# Patient Record
Sex: MALE | Race: WHITE
[De-identification: names, ages, dates, MRNs, and addresses within clinical notes are randomized per-mention and may not be internally consistent; named-entity substitution may affect disease eponyms.]

---

## 2017-01-09 ENCOUNTER — HOSPITAL ENCOUNTER (OUTPATIENT)
Dept: HOSPITAL 45 - C.RADBC | Age: 67
Discharge: HOME | End: 2017-01-09
Attending: NURSE PRACTITIONER
Payer: COMMERCIAL

## 2017-01-09 DIAGNOSIS — N20.0: Primary | ICD-10-CM

## 2017-01-09 NOTE — DIAGNOSTIC IMAGING REPORT
KUB



CLINICAL HISTORY: N20.0 [id number]    



COMPARISON STUDY:  8/10/2006 



FINDINGS: There is no pathologic bowel dilatation. There is a 3 mm calcification

projected over the lower pole the left kidney suspicious for a calculus. There

is a mild lumbar scoliosis. Degenerative changes are present within the spine.



IMPRESSION:  Suspected 3 mm left renal calculus 







Electronically signed by:  Brenton Carmichael M.D.

1/9/2017 2:47 PM



Dictated Date/Time:  1/9/2017 2:46 PM

## 2017-01-24 LAB
ANION GAP SERPL CALC-SCNC: 6 MMOL/L (ref 3–11)
BASOPHILS # BLD: 0.07 K/UL (ref 0–0.2)
BASOPHILS NFR BLD: 1.2 %
BUN SERPL-MCNC: 13 MG/DL (ref 7–18)
BUN/CREAT SERPL: 13.8 (ref 10–20)
CALCIUM SERPL-MCNC: 8.7 MG/DL (ref 8.5–10.1)
CHLORIDE SERPL-SCNC: 108 MMOL/L (ref 98–107)
CO2 SERPL-SCNC: 29 MMOL/L (ref 21–32)
COMPLETE: YES
CREAT CL PREDICTED SERPL C-G-VRATE: 90.7 ML/MIN
CREAT SERPL-MCNC: 0.97 MG/DL (ref 0.6–1.4)
EOSINOPHIL NFR BLD AUTO: 168 K/UL (ref 130–400)
GLUCOSE SERPL-MCNC: 94 MG/DL (ref 70–99)
HCT VFR BLD CALC: 41.6 % (ref 42–52)
IG%: 0.2 %
IMM GRANULOCYTES NFR BLD AUTO: 34.2 %
LYMPHOCYTES # BLD: 2.05 K/UL (ref 1.2–3.4)
MCH RBC QN AUTO: 30.5 PG (ref 25–34)
MCHC RBC AUTO-ENTMCNC: 34.1 G/DL (ref 32–36)
MCV RBC AUTO: 89.3 FL (ref 80–100)
MONOCYTES NFR BLD: 9.2 %
NEUTROPHILS # BLD AUTO: 9.7 %
NEUTROPHILS NFR BLD AUTO: 45.5 %
PMV BLD AUTO: 10.1 FL (ref 7.4–10.4)
POTASSIUM SERPL-SCNC: 4.6 MMOL/L (ref 3.5–5.1)
RBC # BLD AUTO: 4.66 M/UL (ref 4.7–6.1)
SODIUM SERPL-SCNC: 143 MMOL/L (ref 136–145)
WBC # BLD AUTO: 5.99 K/UL (ref 4.8–10.8)

## 2017-01-24 NOTE — PAT MEDICATION INSTRUCTIONS
Service Date


Jan 24, 2017.





Current Home Medication List


Aspirin (Aspirin Ec), 325 MG PO QAM


Cholecalciferol (Vitamin D3), 1 TAB PO BID


Fesoterodine Fumarate (Toviaz), 1 TAB PO QAM


Finasteride (Proscar), 5 MG PO QPM


Fish Oil ([place]-3), 1,000 MG PO BID


Losartan Potassium (Cozaar), 50 MG PO QAM


Meloxicam (Mobic), 7.5 MG PO BID


Metoprolol Tartrate (Lopressor) (Lopressor), 12.5 MG PO BID


Multivitamin (Multivitamin), 1 TAB PO QPM


Nitroglycerin (Nitrostat), 0.4 MG SL UD PRN for RN


Simvastatin (Zocor), 20 MG PO QAM


[Vitamin B12], 1 TAB PO QPM





Medication Instructions


For Your Scheduled Surgery 





Aspirin (Aspirin Ec), 325 MG PO QAM (Check with surgeon/cardiologist for 

instructions)


Meloxicam (Mobic), 7.5 MG PO BID (check with surgeon for instructions)





- Hold the following medications starting 1/24/17:


Fish Oil ([place]-3), 1,000 MG PO BID





- Hold the following medications the morning of surgery:


Losartan Potassium (Cozaar), 50 MG PO QAM


Cholecalciferol (Vitamin D3), 1 TAB PO BID


Fesoterodine Fumarate (Toviaz), 1 TAB PO QAM





- Take the following medications the morning of surgery with a sip of water:


Simvastatin (Zocor), 20 MG PO QAM


Nitroglycerin (Nitrostat), 0.4 MG SL UD PRN for RN


Metoprolol Tartrate (Lopressor) (Lopressor), 12.5 MG PO BID





- Take the following medications as scheduled the night before surgery:


[Vitamin B12], 1 TAB PO QPM


Nitroglycerin (Nitrostat), 0.4 MG SL UD PRN for RN


Multivitamin (Multivitamin), 1 TAB PO QPM


Metoprolol Tartrate (Lopressor) (Lopressor), 12.5 MG PO BID


Finasteride (Proscar), 5 MG PO QPM


Cholecalciferol (Vitamin D3), 1 TAB PO BID





If you have any questions please call us at [phone] (Cheryl Dao PA-C

) or [phone] or [phone]

## 2017-01-26 NOTE — HISTORY & PHYSICAL EXAMINATION
DATE OF ADMISSION:  01/27/2017

 

DIAGNOSIS:  Septal deviation and obstructive sleep apnea.

 

HISTORY OF PRESENT ILLNESS:  This 66-year-old gentleman presented with

history of severe sleep apnea.  His respiratory distress index was 39.4.  His

lowest oxygen saturation was 87%.  He was unable to wear CPAP because of

nasal obstruction.

 

PAST MEDICAL HISTORY:  Medical problems; heart attack in February 2011,

hypertension, hypercholesterolemia and sleep apnea.

 

PREVIOUS SURGERIES:  Back surgery and cardiac stents in 2011.

 

MEDICATIONS:  Metoprolol, simvastatin and losartan.  Aspirin, which is being

held.

 

ALLERGIES:  None known.

 

FAMILY HISTORY:  Negative.

 

SOCIAL HISTORY:  Negative.

 

REVIEW OF SYSTEMS:  Otherwise negative.

 

PHYSICAL EXAMINATION:

GENERAL:  WNWD,  male, 6 feet 1 inch, 225 pounds.

HEAD:  Normocephalic.

EYES:  Normal.

EARS:  Tympanic membranes intact.

NOSE:  Nasal passages; septal deviation to the right with turbinate

hypertrophy.  Pharynx, 3+ tonsillar hypertrophy.

NECK:  Supple.

HEART:  RRR.

LUNGS:  Clear.

ABDOMEN:  Soft.

GENITOURINARY:  Deferred.

EXTREMITIES:  Full range of motion.

 

IMPRESSION:  Septal deviation and obstructive sleep apnea.

 

PLAN:  Septoplasty with adenotonsillectomy, uvulopalatopharyngoplasty and

somnoplasty of the tongue base.

## 2017-01-27 ENCOUNTER — HOSPITAL ENCOUNTER (OUTPATIENT)
Dept: HOSPITAL 45 - C.ACU | Age: 67
Setting detail: OBSERVATION
LOS: 1 days | Discharge: HOME | End: 2017-01-28
Attending: OTOLARYNGOLOGY | Admitting: OTOLARYNGOLOGY
Payer: COMMERCIAL

## 2017-01-27 VITALS
BODY MASS INDEX: 29.28 KG/M2 | HEIGHT: 73 IN | HEIGHT: 73 IN | BODY MASS INDEX: 29.28 KG/M2 | WEIGHT: 220.9 LBS | WEIGHT: 220.9 LBS | BODY MASS INDEX: 29.28 KG/M2

## 2017-01-27 VITALS
OXYGEN SATURATION: 94 % | SYSTOLIC BLOOD PRESSURE: 177 MMHG | TEMPERATURE: 98.06 F | HEART RATE: 60 BPM | DIASTOLIC BLOOD PRESSURE: 85 MMHG

## 2017-01-27 VITALS
HEART RATE: 58 BPM | DIASTOLIC BLOOD PRESSURE: 69 MMHG | SYSTOLIC BLOOD PRESSURE: 157 MMHG | TEMPERATURE: 97.34 F | OXYGEN SATURATION: 93 %

## 2017-01-27 VITALS
SYSTOLIC BLOOD PRESSURE: 156 MMHG | HEART RATE: 59 BPM | OXYGEN SATURATION: 94 % | TEMPERATURE: 98.24 F | DIASTOLIC BLOOD PRESSURE: 71 MMHG

## 2017-01-27 VITALS
OXYGEN SATURATION: 95 % | SYSTOLIC BLOOD PRESSURE: 176 MMHG | TEMPERATURE: 97.88 F | HEART RATE: 63 BPM | DIASTOLIC BLOOD PRESSURE: 82 MMHG

## 2017-01-27 VITALS
SYSTOLIC BLOOD PRESSURE: 183 MMHG | DIASTOLIC BLOOD PRESSURE: 89 MMHG | OXYGEN SATURATION: 94 % | HEART RATE: 61 BPM | TEMPERATURE: 97.88 F

## 2017-01-27 VITALS
SYSTOLIC BLOOD PRESSURE: 166 MMHG | TEMPERATURE: 97.52 F | OXYGEN SATURATION: 96 % | DIASTOLIC BLOOD PRESSURE: 71 MMHG | HEART RATE: 51 BPM

## 2017-01-27 VITALS
TEMPERATURE: 97.88 F | HEART RATE: 43 BPM | SYSTOLIC BLOOD PRESSURE: 201 MMHG | OXYGEN SATURATION: 98 % | DIASTOLIC BLOOD PRESSURE: 88 MMHG

## 2017-01-27 VITALS — OXYGEN SATURATION: 97 %

## 2017-01-27 VITALS
SYSTOLIC BLOOD PRESSURE: 163 MMHG | DIASTOLIC BLOOD PRESSURE: 75 MMHG | OXYGEN SATURATION: 95 % | HEART RATE: 59 BPM | TEMPERATURE: 98.06 F

## 2017-01-27 VITALS
DIASTOLIC BLOOD PRESSURE: 78 MMHG | SYSTOLIC BLOOD PRESSURE: 157 MMHG | OXYGEN SATURATION: 94 % | HEART RATE: 63 BPM | TEMPERATURE: 97.52 F

## 2017-01-27 DIAGNOSIS — J34.2: ICD-10-CM

## 2017-01-27 DIAGNOSIS — I10: ICD-10-CM

## 2017-01-27 DIAGNOSIS — Z79.82: ICD-10-CM

## 2017-01-27 DIAGNOSIS — E78.00: ICD-10-CM

## 2017-01-27 DIAGNOSIS — G47.33: Primary | ICD-10-CM

## 2017-01-27 DIAGNOSIS — I25.2: ICD-10-CM

## 2017-01-27 RX ADMIN — DEXTROSE MONOHYDRATE, SODIUM CHLORIDE, AND POTASSIUM CHLORIDE SCH MLS/HR: 50; 4.5; 1.49 INJECTION, SOLUTION INTRAVENOUS at 22:39

## 2017-01-27 RX ADMIN — CEFAZOLIN SCH MLS/HR: 10 INJECTION, POWDER, FOR SOLUTION INTRAVENOUS at 22:39

## 2017-01-27 RX ADMIN — MORPHINE SULFATE PRN MG: 4 INJECTION, SOLUTION INTRAMUSCULAR; INTRAVENOUS at 15:46

## 2017-01-27 RX ADMIN — HYDROCODONE BITARTRATE AND ACETAMINOPHEN PRN ML: 7.5; 325 SOLUTION ORAL at 13:12

## 2017-01-27 RX ADMIN — DEXTROSE MONOHYDRATE, SODIUM CHLORIDE, AND POTASSIUM CHLORIDE SCH MLS/HR: 50; 4.5; 1.49 INJECTION, SOLUTION INTRAVENOUS at 13:09

## 2017-01-27 RX ADMIN — CEFAZOLIN SCH MLS/HR: 10 INJECTION, POWDER, FOR SOLUTION INTRAVENOUS at 14:32

## 2017-01-27 RX ADMIN — MORPHINE SULFATE PRN MG: 4 INJECTION, SOLUTION INTRAMUSCULAR; INTRAVENOUS at 20:45

## 2017-01-27 RX ADMIN — ACETAMINOPHEN PRN MG: 160 SOLUTION ORAL at 10:34

## 2017-01-27 NOTE — OPERATIVE REPORT
DATE OF OPERATION:  01/27/2017

 

PREOPERATIVE DIAGNOSES:  Obstructive sleep apnea, septal deviation.

 

POSTOPERATIVE DIAGNOSES:  Same.

 

PROCEDURE:  Septoplasty with tonsillectomy and uvulopalatopharyngoplasty and

radiofrequency volume reduction of the tongue base.

 

SURGEON:  Dr. Guzman.

 

ANESTHESIA:  General endotracheal.

 

COMPLICATIONS:  None.

 

BLOOD LOSS:  30 mL

 

HISTORY:  This 66-year-old gentleman presented with significant obstructive

sleep apnea with RDI of 39.4, lowest oxygen saturation of 87%.  He has

difficulty wearing CPAP because of the nasal obstruction.  He also has

significant adenotonsillar hypertrophy and requested definitive treatment.

 

DESCRIPTION OF PROCEDURE:  The patient was brought to the operating room and

placed in supine position.  General endotracheal anesthesia was induced. 

Prepped and draped in the usual sterile manner.  The radiofrequency volume

reduction of the tongue base was performed with [employer] machine creating 7

double prong lesions in the rapid lesion mode with the setting at 600 joules.

 Seven lesions were created across the tongue base posteriorly.

 

The nose was decongested using topical cottonoids with a solution of 4 mL of

4% Xylocaine mixed with 1 mL of epinephrine.  Injection of 0.5% Sensorcaine

with 1:200,000 strength epinephrine was also used.  The left hemitransfixion

incision was made and mucoperichondrium was elevated off the left side of the

septum.  Posteriorly, the cartilage was  from the perpendicular

plate of ethmoid and inferiorly from the vomer maxillary crest.  Bilateral

inferior tunnels were elevated and then bilateral posterior tunnels were

elevated, isolating a large bony cartilaginous spur inferiorly which was

trimmed using 15 blade and the [place]-Chavez rongeurs.  There was also a

large bony spur projecting to the left posteriorly.  This was removed using

the Ramon-Chavez rongeurs and the Yesy forceps superiorly.  The

deviated portion of the perpendicular plate of the ethmoid was removed in

small pieces using the [place]-Chavez rongeur.  The caudal end of the

septum also had to be straightened because there was an acute angulation to

the right.  This was straightened using the cartilage carving technique.  The

septum was closed using continuous mattress suture of 4-0 plain gut. 

Anterior packing of Gelfoam was placed on the left side.

 

The mouth gag was placed.  Peritonsillar area injected with 0.5% Sensorcaine

with 1:200,000 strength epinephrine.  Tonsillectomies were performed using

the plasma knife.  Inspection of the adenoid showed minimal adenoid tissue. 

Uvulopalatoplasty was performed using #12 blade, resecting a portion of the

soft palate along with the anterior surface of the uvula, trimming the uvula

to its tip but preserving the tip of the uvula.  The uvula was sewn up on

itself.  Palatoglossal flap was created by a V cut using the Metzenbaum

scissors superiorly.  The palatopharyngeal fold was swung up superiorly into the

V cut.  All the mucosal edges were closed using 2-0 chromic sutures

continuously interlocking manner on the left side and then the same suture on

the right side.  This was tied up at the uvula which was sewn up to the soft

palate in the midline.  The pharynx was irrigated clean with saline.  The

patient tolerated the procedure well and was taken to recovery area in

satisfactory condition.

 

 

I attest to the content of the Intraoperative Record and any orders documented 
therein. Any exceptions are noted below.

 

 

 

[place]

## 2017-01-27 NOTE — ANESTHESIOLOGY PROGRESS NOTE
Anesthesia Post Op Note


Date & Time


Jan 27, 2017 at 09:53





Vital Signs


Pain Intensity:  3





 Vital Signs Past 12 Hours








  Date Time  Temp Pulse Resp B/P Pulse Ox O2 Delivery O2 Flow Rate FiO2


 


1/27/17 09:33    142/71    


 


1/27/17 09:32  42 9  96   


 


1/27/17 09:32  43 9     


 


1/27/17 09:28    153/78    


 


1/27/17 09:27  41 13     


 


1/27/17 09:27  41 13  97   


 


1/27/17 09:24    160/80    


 


1/27/17 09:22  35 15     


 


1/27/17 09:22  35 15  97   


 


1/27/17 09:18    193/84    


 


1/27/17 09:17  41 13 176/89 98   


 


1/27/17 09:17  39 13     


 


1/27/17 09:13    207/96    


 


1/27/17 09:12  43 14     


 


1/27/17 09:12  43 14 191/91 100   


 


1/27/17 09:02 36.6 40 16 191/91 100 Mask 10 


 


1/27/17 09:00 36.6 48 16 183/90 100 Mask 10 


 


1/27/17 05:55 36.6 43 18 201/88 98 Room Air  











Notes


Mental Status:  alert / awake / arousable, participated in evaluation


Pt Amnestic to Procedure:  Yes


Nausea / Vomiting:  adequately controlled


Pain:  adequately controlled


Airway Patency, RR, SpO2:  stable & adequate


BP & HR:  stable & adequate


Hydration State:  stable & adequate


Anesthetic Complications:  no major complications apparent


Pt did receive one dose of hydralazine 10mg IV in pacu for BP 190s/90s.  He was 

asymptomatic with this blood pressure.  On dispo from recovery his BP was 150s/

80s.

## 2017-01-28 VITALS
HEART RATE: 57 BPM | SYSTOLIC BLOOD PRESSURE: 152 MMHG | TEMPERATURE: 97.88 F | DIASTOLIC BLOOD PRESSURE: 73 MMHG | OXYGEN SATURATION: 95 %

## 2017-01-28 VITALS
HEART RATE: 60 BPM | SYSTOLIC BLOOD PRESSURE: 157 MMHG | TEMPERATURE: 98.78 F | OXYGEN SATURATION: 93 % | DIASTOLIC BLOOD PRESSURE: 70 MMHG

## 2017-01-28 VITALS — OXYGEN SATURATION: 93 %

## 2017-01-28 VITALS
TEMPERATURE: 97.52 F | SYSTOLIC BLOOD PRESSURE: 149 MMHG | HEART RATE: 52 BPM | DIASTOLIC BLOOD PRESSURE: 78 MMHG | OXYGEN SATURATION: 94 %

## 2017-01-28 VITALS
HEART RATE: 52 BPM | OXYGEN SATURATION: 95 % | DIASTOLIC BLOOD PRESSURE: 73 MMHG | SYSTOLIC BLOOD PRESSURE: 152 MMHG | TEMPERATURE: 98.06 F

## 2017-01-28 VITALS
HEART RATE: 60 BPM | OXYGEN SATURATION: 93 % | SYSTOLIC BLOOD PRESSURE: 157 MMHG | TEMPERATURE: 98.78 F | DIASTOLIC BLOOD PRESSURE: 70 MMHG

## 2017-01-28 RX ADMIN — MORPHINE SULFATE PRN MG: 4 INJECTION, SOLUTION INTRAMUSCULAR; INTRAVENOUS at 06:20

## 2017-01-28 RX ADMIN — CEFAZOLIN SCH MLS/HR: 10 INJECTION, POWDER, FOR SOLUTION INTRAVENOUS at 06:20

## 2017-01-28 RX ADMIN — DEXTROSE MONOHYDRATE, SODIUM CHLORIDE, AND POTASSIUM CHLORIDE SCH MLS/HR: 50; 4.5; 1.49 INJECTION, SOLUTION INTRAVENOUS at 07:41

## 2017-01-28 RX ADMIN — ACETAMINOPHEN PRN MG: 160 SOLUTION ORAL at 06:20

## 2017-01-28 RX ADMIN — HYDROCODONE BITARTRATE AND ACETAMINOPHEN PRN ML: 7.5; 325 SOLUTION ORAL at 10:30

## 2017-01-28 NOTE — DISCHARGE INSTRUCTIONS
Discharge Instructions


Admission


Reason for Admission:  Sleep Apnea





Discharge


Discharge Diagnosis / Problem:  same





Discharge Goals


Goal(s):  Improve function





Activity Recommendations


Activity Limitations:  per Instructions/Follow-up section


Lifting Limitations:  gradually increase as tolerated


Exercise/Sports Limitations:  as tolerated


May Resume Sexual Activity:  when tolerated


Shower/Bathe:  no limitations


Driving or Machine Use:  resume 3 days after discharge


do not drive on narcotic pain meds


.





Instructions / Follow-Up


Instructions / Follow-Up


ACTIVITY RECOMMENDATIONS:





*   During the first few days, activities should be limited.





*   Stay indoors for several days.





*   After 48 hours, activity can gradually be increased to normal activity.








RETURN TO SCHOOL/WORK:





*  Return to school or work in one week.





*  No physical education for two weeks.








OVER THE COUNTER MEDICATIONS:





*   You may use Tylenol


*   Avoid aspirin or aspirin containing products, e.g. as they may increase 

bleeding.








SPECIAL CARE INSTRUCTIONS:





*  Avoid coughing or clearing the throat.





*  Do not use a straw.





*  A sore throat is expected frequently accompanied by pain radiating


    to the ears.  This is normal.





*  Expect bad breath until "scabs" are healed.





*  Notify the doctor if bleeding occurs, vomiting, temperature greater than


   101 degrees Fahrenheit.  Call [phone] or cell phone: [phone].





*  If bleeding occurs, it is usually in the first 24 hours or after the 5th 

day.  If 


    unable to reach the doctor, go to the nearest Emergency Department.





Special Diet:





*  Fluids are very important and should be encouraged to maintain adequate 


    hydration.





*  To maintain nutrition, eat soft foods and after 48 hours the consistency


    of foods can be increased.  Examples are jello, soup, pasta, ice cream


    and mashed foods.








FOLLOW UP VISIT:





Follow-up visit with Dr. Guzman in 2 weeks.  


Please call [phone] to schedule if not already scheduled.ACTIVITY 

RECOMMENDATIONS:





*  Being up and around is good, but no strenuous activity, heavy lifting or 

physical 


   exertion for one week.





*  Keep your head elevated 30 degrees when lying down or sleeping.








OVER THE COUNTER MEDICATIONS:





*  You may use Tylenol


*  Avoid aspirin or aspirin containing products, e.g. as they may increase 

bleeding.








SPECIAL CARE INSTRUCTIONS:





*  Expect to have bloody drainage from your nose and/or down your


    throat for one to three days.  Change drip pad as needed.





*  Begin irrigating your nose with saline solution today, at least six


    to ten times per day and sniff back to help remove old clots or crust.





*  You may experience nasal and facial congestion, pain and pressure,


    this is normal.





*  Please call with any significant and/or progressive pain, redness, swelling 


    around the eyes, visual changes, fever of 101.5 degrees F, active bleeding


    or any problems or concerns.





*  If active bleeding occurs, spray the nose three times at one minute intervals


    with Afrin spray and call [phone] or cell phone: [phone]. 


    If unable to reach the doctor, go to the nearest Emergency Department.





Special Diet:





*  Avoid extremely hot fluids.








FOLLOW UP VISIT:





Follow-up Visit with Dr. Guzman    


If not already scheduled, please call [phone] to schedule.





Current Hospital Diet


Patient's current hospital diet: Full Liquid Diet





Discharge Diet


Recommended Diet:  Regular Diet


Diet Texture:  Mechanical Soft (ground)





Procedures


Procedures Performed:  


Septoplasty, Bilateral Tonsillectomy,





Uvulopalatalpharyngoplasty, Somnoplasty of Tongue





Pending Studies


Studies pending at discharge:  no





Medical Emergencies








.


Who to Call and When:





Medical Emergencies:  If at any time you feel your situation is an emergency, 

please call 911 immediately.





.





Non-Emergent Contact


Non-Emergency issues call your:  Primary Care Provider


.





"Provider Documentation" section prepared by Vita Guzman.





VTE Core Measure


Inpt VTE Proph given/why not?:  T.E.D. Stockings





[place] Drug Monitoring Program


Search Results:  no issues identified

## 2017-02-06 NOTE — DISCHARGE SUMMARY
DIAGNOSIS:  Obstructive sleep apnea.

 

HISTORY OF PRESENT ILLNESS:  A 66-year-old gentleman with significant sleep

apnea, unable to tolerate CPAP, requested definitive treatment.

 

HOSPITAL COURSE:  The patient was admitted and taken to the operating room on

01/27, where he underwent septoplasty and then tonsillectomy and also

uvulopalatopharyngoplasty.  He also had radiofrequency volume reduction of

the tongue base without complications.  He was observed in the recovery room

and then transferred to the floor for observation.  Postoperatively, the

patient did well with minimal swelling and was able to breathe through his

nose.  He was discharged on 01/28 with instructions and also instructed to

return for followup in my office in 2 weeks.

## 2017-05-31 LAB
ANION GAP SERPL CALC-SCNC: 7 MMOL/L (ref 3–11)
APPEARANCE UR: CLEAR
BASOPHILS # BLD: 0.02 K/UL (ref 0–0.2)
BASOPHILS NFR BLD: 0.3 %
BILIRUB UR-MCNC: (no result) MG/DL
BUN SERPL-MCNC: 10 MG/DL (ref 7–18)
BUN/CREAT SERPL: 10.2 (ref 10–20)
CALCIUM SERPL-MCNC: 8.3 MG/DL (ref 8.5–10.1)
CHLORIDE SERPL-SCNC: 110 MMOL/L (ref 98–107)
CO2 SERPL-SCNC: 27 MMOL/L (ref 21–32)
COLOR UR: YELLOW
COMPLETE: YES
CREAT CL PREDICTED SERPL C-G-VRATE: 93.3 ML/MIN
CREAT SERPL-MCNC: 0.94 MG/DL (ref 0.6–1.4)
EOSINOPHIL NFR BLD AUTO: 165 K/UL (ref 130–400)
GLUCOSE SERPL-MCNC: 122 MG/DL (ref 70–99)
HCT VFR BLD CALC: 37.4 % (ref 42–52)
IG%: 0.2 %
IMM GRANULOCYTES NFR BLD AUTO: 32.5 %
LYMPHOCYTES # BLD: 1.88 K/UL (ref 1.2–3.4)
MANUAL MICROSCOPIC REQUIRED?: NO
MCH RBC QN AUTO: 31 PG (ref 25–34)
MCHC RBC AUTO-ENTMCNC: 33.4 G/DL (ref 32–36)
MCV RBC AUTO: 92.8 FL (ref 80–100)
MONOCYTES NFR BLD: 9.3 %
NEUTROPHILS # BLD AUTO: 4.5 %
NEUTROPHILS NFR BLD AUTO: 53.2 %
NITRITE UR QL STRIP: (no result)
PH UR STRIP: 5.5 [PH] (ref 4.5–7.5)
PMV BLD AUTO: 10.1 FL (ref 7.4–10.4)
POTASSIUM SERPL-SCNC: 4.1 MMOL/L (ref 3.5–5.1)
RBC # BLD AUTO: 4.03 M/UL (ref 4.7–6.1)
REVIEW REQ?: NO
SODIUM SERPL-SCNC: 144 MMOL/L (ref 136–145)
SP GR UR STRIP: 1.02 (ref 1–1.03)
URINE BILL WITH OR WITHOUT MIC: (no result)
UROBILINOGEN UR-MCNC: (no result) MG/DL
WBC # BLD AUTO: 5.79 K/UL (ref 4.8–10.8)

## 2017-05-31 NOTE — PAT MEDICATION INSTRUCTIONS
Service Date


May 31, 2017.





Current Home Medication List


Aspirin (Aspirin Ec), 325 MG PO QAM


Cholecalciferol (Vitamin D3), 1 TAB PO QAM


Finasteride (Proscar), 5 MG PO QPM


Fish Oil ([place]-3), 1,000 MG PO BID


Losartan Potassium (Cozaar), 50 MG PO QAM


Meloxicam (Mobic), 7.5 MG PO QPM


Metoprolol Tartrate (Lopressor) (Lopressor), 12.5 MG PO BID


Multivitamin (Multivitamin), 1 TAB PO QPM


Nitroglycerin (Nitrostat), 0.4 MG SL UD PRN for RN


Simvastatin (Zocor), 20 MG PO QAM


Tamsulosin HCl (Tamsulosin HCl), 0.4 MG PO QPM


[Vitamin B12], 1 TAB PO QPM





Medication Instructions


For Your Scheduled Surgery 





- Check with surgeon/prescribing physician for instructions: 


Aspirin (Aspirin Ec), 325 MG PO QAM








- Check with surgeon for instructions: 


Meloxicam (Mobic), 7.5 MG PO QPM








- Hold the following medications 2 weeks prior to surgery:


Fish Oil ([place]-3), 1,000 MG PO BID








- Hold the following medications the morning of surgery:


Multivitamin (Multivitamin), 1 TAB PO QPM


Losartan Potassium (Cozaar), 50 MG PO QAM


Cholecalciferol (Vitamin D3), 1 TAB PO QAM








- Take the following medications the morning of surgery with a sip of water:


Simvastatin (Zocor), 20 MG PO QAM


Metoprolol Tartrate (Lopressor) (Lopressor), 12.5 MG PO BID


Nitroglycerin (Nitrostat), 0.4 MG SL UD PRN for RN (if needed)








- Take the following medications as scheduled the night before surgery:


[Vitamin B12], 1 TAB PO QPM


Tamsulosin HCl (Tamsulosin HCl), 0.4 MG PO QPM


Metoprolol Tartrate (Lopressor) (Lopressor), 12.5 MG PO BID


Finasteride (Proscar), 5 MG PO QPM


Nitroglycerin (Nitrostat), 0.4 MG SL UD PRN for RN  (if needed)








If you have any questions please call us at [phone] or [phone] or 

[phone]

## 2017-06-15 ENCOUNTER — HOSPITAL ENCOUNTER (OUTPATIENT)
Dept: HOSPITAL 45 - C.ACU | Age: 67
Setting detail: OBSERVATION
LOS: 1 days | Discharge: HOME | End: 2017-06-16
Attending: UROLOGY | Admitting: UROLOGY
Payer: COMMERCIAL

## 2017-06-15 VITALS
SYSTOLIC BLOOD PRESSURE: 169 MMHG | TEMPERATURE: 97.52 F | OXYGEN SATURATION: 97 % | DIASTOLIC BLOOD PRESSURE: 84 MMHG | HEART RATE: 46 BPM

## 2017-06-15 VITALS — SYSTOLIC BLOOD PRESSURE: 144 MMHG | DIASTOLIC BLOOD PRESSURE: 66 MMHG | HEART RATE: 61 BPM

## 2017-06-15 VITALS — HEART RATE: 62 BPM | OXYGEN SATURATION: 98 % | SYSTOLIC BLOOD PRESSURE: 169 MMHG | DIASTOLIC BLOOD PRESSURE: 84 MMHG

## 2017-06-15 VITALS
HEART RATE: 54 BPM | DIASTOLIC BLOOD PRESSURE: 80 MMHG | OXYGEN SATURATION: 94 % | SYSTOLIC BLOOD PRESSURE: 168 MMHG | TEMPERATURE: 97.52 F

## 2017-06-15 VITALS
HEART RATE: 52 BPM | DIASTOLIC BLOOD PRESSURE: 74 MMHG | SYSTOLIC BLOOD PRESSURE: 171 MMHG | TEMPERATURE: 97.52 F | OXYGEN SATURATION: 95 %

## 2017-06-15 VITALS — SYSTOLIC BLOOD PRESSURE: 151 MMHG | OXYGEN SATURATION: 92 % | TEMPERATURE: 98.24 F | DIASTOLIC BLOOD PRESSURE: 71 MMHG

## 2017-06-15 VITALS
SYSTOLIC BLOOD PRESSURE: 165 MMHG | DIASTOLIC BLOOD PRESSURE: 81 MMHG | OXYGEN SATURATION: 97 % | TEMPERATURE: 97.7 F | HEART RATE: 49 BPM

## 2017-06-15 VITALS
HEIGHT: 73 IN | BODY MASS INDEX: 29.04 KG/M2 | WEIGHT: 219.14 LBS | WEIGHT: 219.14 LBS | BODY MASS INDEX: 29.04 KG/M2 | HEIGHT: 73 IN | BODY MASS INDEX: 29.04 KG/M2

## 2017-06-15 VITALS — SYSTOLIC BLOOD PRESSURE: 162 MMHG | DIASTOLIC BLOOD PRESSURE: 74 MMHG

## 2017-06-15 VITALS
OXYGEN SATURATION: 97 % | HEART RATE: 44 BPM | DIASTOLIC BLOOD PRESSURE: 77 MMHG | TEMPERATURE: 97.7 F | SYSTOLIC BLOOD PRESSURE: 154 MMHG

## 2017-06-15 VITALS
HEART RATE: 59 BPM | DIASTOLIC BLOOD PRESSURE: 77 MMHG | SYSTOLIC BLOOD PRESSURE: 165 MMHG | OXYGEN SATURATION: 92 % | TEMPERATURE: 98.06 F

## 2017-06-15 VITALS
OXYGEN SATURATION: 94 % | HEART RATE: 53 BPM | DIASTOLIC BLOOD PRESSURE: 72 MMHG | TEMPERATURE: 97.7 F | SYSTOLIC BLOOD PRESSURE: 159 MMHG

## 2017-06-15 VITALS — SYSTOLIC BLOOD PRESSURE: 142 MMHG | DIASTOLIC BLOOD PRESSURE: 68 MMHG

## 2017-06-15 DIAGNOSIS — Z87.442: ICD-10-CM

## 2017-06-15 DIAGNOSIS — Z82.49: ICD-10-CM

## 2017-06-15 DIAGNOSIS — Z90.89: ICD-10-CM

## 2017-06-15 DIAGNOSIS — Z79.899: ICD-10-CM

## 2017-06-15 DIAGNOSIS — F32.9: ICD-10-CM

## 2017-06-15 DIAGNOSIS — Z85.820: ICD-10-CM

## 2017-06-15 DIAGNOSIS — I10: ICD-10-CM

## 2017-06-15 DIAGNOSIS — I25.2: ICD-10-CM

## 2017-06-15 DIAGNOSIS — N13.8: ICD-10-CM

## 2017-06-15 DIAGNOSIS — Z79.82: ICD-10-CM

## 2017-06-15 DIAGNOSIS — E78.5: ICD-10-CM

## 2017-06-15 DIAGNOSIS — N20.0: ICD-10-CM

## 2017-06-15 DIAGNOSIS — R35.0: ICD-10-CM

## 2017-06-15 DIAGNOSIS — R33.9: ICD-10-CM

## 2017-06-15 DIAGNOSIS — Z98.890: ICD-10-CM

## 2017-06-15 DIAGNOSIS — E66.9: ICD-10-CM

## 2017-06-15 DIAGNOSIS — Z83.3: ICD-10-CM

## 2017-06-15 DIAGNOSIS — N40.1: Primary | ICD-10-CM

## 2017-06-15 DIAGNOSIS — G47.33: ICD-10-CM

## 2017-06-15 RX ADMIN — SODIUM CHLORIDE, SODIUM LACTATE, POTASSIUM CHLORIDE, AND CALCIUM CHLORIDE SCH MLS/HR: 600; 310; 30; 20 INJECTION, SOLUTION INTRAVENOUS at 17:40

## 2017-06-15 RX ADMIN — SODIUM CHLORIDE, SODIUM LACTATE, POTASSIUM CHLORIDE, AND CALCIUM CHLORIDE SCH MLS/HR: 600; 310; 30; 20 INJECTION, SOLUTION INTRAVENOUS at 11:56

## 2017-06-15 RX ADMIN — METOPROLOL TARTRATE SCH MG: 25 TABLET, FILM COATED ORAL at 21:11

## 2017-06-15 NOTE — [facility] POST OPERATIVE BRIEF NOTE
Immediate Operative Summary


Operative Date


Tanvir 15, 2017.





Pre-Operative Diagnosis





Benign Prostactic Hyperplasia with Obstruction





Post-Operative Diagnosis





Benign Prostactic Hyperplasia with Obstruction





Procedure(s) Performed





Cystoscopy; TURP





Surgeon


Dr. MICHAEL Portillo





Assistant Surgeon(s)


none





Estimated Blood Loss


5 cc





Findings


Bilobar hypertrophy with a high median bar, but no pedunculated median lobe.





Specimens





none per surgeon





Drains


22F sparks





Anesthesia


Gen





Complication(s)


None





Disposition


Recovery Room / PACU (stable)

## 2017-06-15 NOTE — ANESTHESIOLOGY PROGRESS NOTE
Anesthesia Post Op Note


Date & Time


Tanvir 15, 2017 at 10:18





Vital Signs


Pain Intensity:  0





Vital Signs Past 12 Hours








  Date Time  Temp Pulse Resp B/P (MAP) Pulse Ox O2 Delivery O2 Flow Rate FiO2


 


6/15/17 10:10  51 10 153/79 97 Nasal Cannula 2 


 


6/15/17 10:00  57 14 160/85 97 Mask 10 


 


6/15/17 09:50  58 16 159/79 100 Mask 10 


 


6/15/17 09:40 36.6 60 16 165/87 97 Mask 10 


 


6/15/17 07:24 36.5 44 20 154/77 (102) 97 Room Air  











Notes


Mental Status:  alert / awake / arousable, participated in evaluation


Pt Amnestic to Procedure:  Yes


Nausea / Vomiting:  adequately controlled


Pain:  adequately controlled


Airway Patency, RR, SpO2:  stable & adequate


BP & HR:  stable & adequate


Hydration State:  stable & adequate


Anesthetic Complications:  no major complications apparent

## 2017-06-15 NOTE — OPERATIVE REPORT
DATE OF OPERATION:  06/15/2017

 

PREOPERATIVE DIAGNOSIS:  Benign prostatic hypertrophy.

 

POSTOPERATIVE DIAGNOSIS:  Benign prostatic hypertrophy.

 

PROCEDURES PERFORMED:  Cystoscopy and transurethral resection of prostate.

 

ANESTHESIA:  General.

 

ESTIMATED BLOOD LOSS:  5 mL.

 

URINE OUTPUT:  Not recorded.

 

SPECIMENS:  There were no specimens.

 

DRAINS:  22-Czech Arriaga catheter.

 

DESCRIPTION OF THE PROCEDURE:  Wes Tee was identified in the

preoperative holding area.  Appropriate informed consents were reviewed and

completed and the patient was transported to the operating suite.  Upon

arrival, he received appropriate preoperative antibiotics in the form of

ciprofloxacin.  Adequate general anesthesia was achieved.  He was placed in

dorsal lithotomy position, where he was sterilely prepped and draped in

standard fashion.  I began the case by passing a 24-Czech resectoscope with

visual obturator.  Inspection revealed no evidence of stricture disease and

the prostate was noted to have significant lateral lobe hypertrophy as well

as a somewhat high bladder neck and small median bar, but no true

intravesical or pedunculated median lobe.  Inspection of the bladder revealed

ureteral orifices to be in orthotopic position and no evidence of mucosal

disease.  Following my inspection, I exchanged the visual obturator for

resecting element and a button electrode.  I performed an incision of the

prostate at the bladder neck in a line directed from each ureteral orifice

back towards the verumontanum.  This dropped the bladder neck considerably

and in turn better clarified the median bar.  I was able to resect this

median bar completely and created a flush posterior bladder neck.  I then

proceeded to resect the left lateral lobe followed by the right lateral lobe

and ultimately very carefully trimmed the area around the apex of the

prostate.  At the conclusion of the case, there was excellent hemostasis as

well as a widely patent prostatic urethra.  I left the bladder full, withdrew

the scope and placed a 22-Czech Arriaga catheter without difficulty.  The

patient tolerated the procedure extremely well, was extubated and taken to

the PACU in stable condition.

 

 

I attest to the content of the Intraoperative Record and any orders documented therein. Any exception
s are noted below.

## 2017-06-16 VITALS
OXYGEN SATURATION: 96 % | HEART RATE: 61 BPM | SYSTOLIC BLOOD PRESSURE: 164 MMHG | TEMPERATURE: 98.06 F | DIASTOLIC BLOOD PRESSURE: 74 MMHG

## 2017-06-16 VITALS
TEMPERATURE: 98.06 F | DIASTOLIC BLOOD PRESSURE: 74 MMHG | OXYGEN SATURATION: 96 % | SYSTOLIC BLOOD PRESSURE: 164 MMHG | HEART RATE: 61 BPM

## 2017-06-16 VITALS
DIASTOLIC BLOOD PRESSURE: 70 MMHG | SYSTOLIC BLOOD PRESSURE: 157 MMHG | TEMPERATURE: 98.06 F | OXYGEN SATURATION: 95 % | HEART RATE: 53 BPM

## 2017-06-16 LAB
ANION GAP SERPL CALC-SCNC: 8 MMOL/L (ref 3–11)
BASOPHILS # BLD: 0.01 K/UL (ref 0–0.2)
BASOPHILS NFR BLD: 0.1 %
BUN SERPL-MCNC: 15 MG/DL (ref 7–18)
BUN/CREAT SERPL: 15.7 (ref 10–20)
CALCIUM SERPL-MCNC: 8.6 MG/DL (ref 8.5–10.1)
CHLORIDE SERPL-SCNC: 108 MMOL/L (ref 98–107)
CO2 SERPL-SCNC: 26 MMOL/L (ref 21–32)
COMPLETE: YES
CREAT CL PREDICTED SERPL C-G-VRATE: 91.3 ML/MIN
CREAT SERPL-MCNC: 0.96 MG/DL (ref 0.6–1.4)
EOSINOPHIL NFR BLD AUTO: 176 K/UL (ref 130–400)
GLUCOSE SERPL-MCNC: 109 MG/DL (ref 70–99)
HCT VFR BLD CALC: 40.3 % (ref 42–52)
IG%: 0.3 %
IMM GRANULOCYTES NFR BLD AUTO: 13.5 %
LYMPHOCYTES # BLD: 1.57 K/UL (ref 1.2–3.4)
MCH RBC QN AUTO: 29.3 PG (ref 25–34)
MCHC RBC AUTO-ENTMCNC: 31.8 G/DL (ref 32–36)
MCV RBC AUTO: 92.2 FL (ref 80–100)
MONOCYTES NFR BLD: 8.8 %
NEUTROPHILS # BLD AUTO: 0.3 %
NEUTROPHILS NFR BLD AUTO: 77 %
PMV BLD AUTO: 10.1 FL (ref 7.4–10.4)
POTASSIUM SERPL-SCNC: 4.2 MMOL/L (ref 3.5–5.1)
RBC # BLD AUTO: 4.37 M/UL (ref 4.7–6.1)
SODIUM SERPL-SCNC: 142 MMOL/L (ref 136–145)
WBC # BLD AUTO: 11.62 K/UL (ref 4.8–10.8)

## 2017-06-16 RX ADMIN — SODIUM CHLORIDE, SODIUM LACTATE, POTASSIUM CHLORIDE, AND CALCIUM CHLORIDE SCH MLS/HR: 600; 310; 30; 20 INJECTION, SOLUTION INTRAVENOUS at 03:32

## 2017-06-16 RX ADMIN — METOPROLOL TARTRATE SCH MG: 25 TABLET, FILM COATED ORAL at 09:05

## 2017-06-16 NOTE — PROGRESS NOTE
Progress Note


Date of Service


Jun 16, 2017.





Progress Note


Doing very well


- urine cleared overnight


- no discomfort


- enjoying his breakfast





6/16/17 06:21








Red Blood Count 4.37, Mean Corpuscular Volume 92.2, Mean Corpuscular Hemoglobin 

29.3, Mean Corpuscular Hemoglobin Concent 31.8, Mean Platelet Volume 10.1, 

Neutrophils (%) (Auto) 77.0, Lymphocytes (%) (Auto) 13.5, Monocytes (%) (Auto) 

8.8, Eosinophils (%) (Auto) 0.3, Basophils (%) (Auto) 0.1, Neutrophils # (Auto) 

8.96, Lymphocytes # (Auto) 1.57, Monocytes # (Auto) 1.02, Eosinophils # (Auto) 

0.03, Basophils # (Auto) 0.01





6/16/17 06:21

















Test


  6/16/17


06:21


 


White Blood Count


  11.62 K/uL


(4.8-10.8)


 


Red Blood Count


  4.37 M/uL


(4.7-6.1)


 


Hemoglobin


  12.8 g/dL


(14.0-18.0)


 


Hematocrit 40.3 % (42-52) 


 


Mean Corpuscular Volume


  92.2 fL


()


 


Mean Corpuscular Hemoglobin


  29.3 pg


(25-34)


 


Mean Corpuscular Hemoglobin


Concent 31.8 g/dl


(32-36)


 


Platelet Count


  176 K/uL


(130-400)


 


Mean Platelet Volume


  10.1 fL


(7.4-10.4)


 


Neutrophils (%) (Auto) 77.0 % 


 


Lymphocytes (%) (Auto) 13.5 % 


 


Monocytes (%) (Auto) 8.8 % 


 


Eosinophils (%) (Auto) 0.3 % 


 


Basophils (%) (Auto) 0.1 % 


 


Neutrophils # (Auto)


  8.96 K/uL


(1.4-6.5)


 


Lymphocytes # (Auto)


  1.57 K/uL


(1.2-3.4)


 


Monocytes # (Auto)


  1.02 K/uL


(0.11-0.59)


 


Eosinophils # (Auto)


  0.03 K/uL


(0-0.5)


 


Basophils # (Auto)


  0.01 K/uL


(0-0.2)


 


RDW Standard Deviation


  45.2 fL


(36.4-46.3)


 


RDW Coefficient of Variation


  13.3 %


(11.5-14.5)


 


Immature Granulocyte % (Auto) 0.3 % 


 


Immature Granulocyte # (Auto)


  0.03 K/uL


(0.00-0.02)


 


Anion Gap


  8.0 mmol/L


(3-11)


 


Est Creatinine Clear Calc


Drug Dose 91.3 ml/min 


 


 


Estimated GFR (


American) 93.8 


 


 


Estimated GFR (Non-


American 80.9 


 


 


BUN/Creatinine Ratio 15.7 (10-20) 


 


Calcium Level


  8.6 mg/dl


(8.5-10.1)








Vital Signs Past 12 Hours








  Date Time  Temp Pulse Resp B/P (MAP) Pulse Ox O2 Delivery O2 Flow Rate FiO2


 


6/16/17 07:05      Room Air  


 


6/16/17 06:56 36.7 61 17 164/74 (104) 96 Room Air  


 


6/16/17 03:18 36.7 53 16 157/70 (99) 95 Room Air  


 


6/15/17 23:50    142/68 (92)    


 


6/15/17 23:21      Room Air  


 


6/15/17 22:58 36.7 59 16 165/77 (106) 92 Room Air  


 


6/15/17 21:07  61  144/66 (92)    





NAD


AAOx3


no resp distress


RRR 


abd soft


non-tender


urine clear





A/P: POD#1 s/p TURP


- voiding trial now


- d/c home after void

## 2017-06-16 NOTE — ANESTHESIOLOGY PROGRESS NOTE
Anesthesia Post Op Note


Date & Time


Jun 16, 2017 at 08:36





Vital Signs


Pain Intensity:  0.0





Vital Signs Past 12 Hours








  Date Time  Temp Pulse Resp B/P (MAP) Pulse Ox O2 Delivery O2 Flow Rate FiO2


 


6/16/17 07:05      Room Air  


 


6/16/17 06:56 36.7 61 17 164/74 (104) 96 Room Air  


 


6/16/17 03:18 36.7 53 16 157/70 (99) 95 Room Air  


 


6/15/17 23:50    142/68 (92)    


 


6/15/17 23:21      Room Air  


 


6/15/17 22:58 36.7 59 16 165/77 (106) 92 Room Air  


 


6/15/17 21:07  61  144/66 (92)    











Notes


Mental Status:  alert / awake / arousable, participated in evaluation


Pt Amnestic to Procedure:  Yes


Nausea / Vomiting:  adequately controlled


Pain:  adequately controlled


Airway Patency, RR, SpO2:  stable & adequate


BP & HR:  stable & adequate


Hydration State:  stable & adequate


Anesthetic Complications:  no major complications apparent

## 2017-06-16 NOTE — DISCHARGE INSTRUCTIONS
Discharge Instructions


Date of Service


Jun 16, 2017.





Admission


Reason for Admission:  Benign Prostatic Hyperplasia





Discharge


Discharge Diagnosis / Problem:  BPH





Discharge Goals


Goal(s):  Decrease discomfort, Improve function, Increase independence, Improve 

disease control, Prevent Disease Progression





Activity Recommendations


Activity Limitations:  resume your previous activity


Lifting Limitations:  no more than 25 pounds


Exercise/Sports Limitations:  as tolerated


May Resume Sexual Activity:  after one week


Shower/Bathe:  no limitations


Driving or Machine Use:  no limitations





.





Instructions / Follow-Up


Instructions / Follow-Up


Please stay well hydrated


Please keep your previously scheduled follow up appointment with Dr. Portillo





[facility] Diet(s):  Regular Diet





Discharge Diet


Recommended Diet:  Regular Diet





Procedures


Procedures Performed:  


Cystoscopy; TURP





Pending Studies


Studies pending at discharge:  no





Medical Emergencies








.


Who to Call and When:





Medical Emergencies:  If at any time you feel your situation is an emergency, 

please call 911 immediately.





.





Non-Emergent Contact


Non-Emergency issues call your:  Urologist


Call Non-Emergent contact if:  you have a fever, temperature is above 101.5, 

your pain is not controlled, your pain is worsening





.


.








"Provider Documentation" section prepared by James Andersen.








.





VTE Core Measure


Inpt VTE Proph given/why not?:  Treatment not indicated

## 2017-06-20 NOTE — DISCHARGE SUMMARY
Discharge Summary


Date of Service


Jun 20, 2017.





Discharge Summary


Admission Date:


Tanvir 15, 2017 at 09:49


Discharge Date:  Jun 16, 2017


Discharge Disposition:  Home


Principal Diagnosis:


BPH


Procedures:


TURP





Medication Reconciliation


New Medications:  


Ciprofloxacin Hcl (Cipro) 500 Mg Tab


500 MG PO BID, #6 TAB





 


Continued Medications:  


Aspirin (Aspirin Ec) 81 Mg Tab


81 MG PO DAILY





Cholecalciferol (Vitamin D3) 1,000 Unit Tab


1 TAB PO QAM for 90 Days, #90 TAB 3 Refills





Fish Oil (Omega-3) 1 Ea Cap


1000 MG PO BID, CAP





Fluoxetine (Prozac) 10 Mg Cap


10 MG PO QAM, CAP





Losartan Potassium (Cozaar) 50 Mg Tab


50 MG PO QAM, TAB





Meloxicam (Mobic) 7.5 Mg Tab


7.5 MG PO QPM, TAB





Metoprolol Tartrate (Lopressor) (Lopressor) 25 Mg Tab


12.5 MG PO BID, TAB





Multivitamin (Multivitamin)  Tab


1 TAB PO QPM, TAB





Nitroglycerin (Nitrostat) 0.4 Mg Tab


0.4 MG SL UD PRN for RN, BTL





Simvastatin (Zocor) 20 Mg Tab


20 MG PO QAM, TAB





[Vitamin B12] ()  


1 TAB PO QPM





 


Discontinued Medications:  


Finasteride (Proscar) 5 Mg Tab


5 MG PO QPM, TAB





Tamsulosin HCl (Tamsulosin HCl) 0.4 Mg Cap


0.4 MG PO QPM











Hospital Course


Admitted for TURP. Details as dictated previously in the operative report.  In 

summary, he tolerated the procedure very well. He passed a voiding trial on the 

morning of post operative day #1 and was discharged in stable condition.


Total time spent on discharge = 


This includes examination of the patient, discharge planning, medication 

reconciliation, and communication with other providers.





Discharge Instructions


please see previously written discharge instructions

## 2017-08-08 ENCOUNTER — HOSPITAL ENCOUNTER (OUTPATIENT)
Dept: HOSPITAL 45 - C.LABSPEC | Age: 67
Discharge: HOME | End: 2017-08-08
Attending: UROLOGY
Payer: COMMERCIAL

## 2017-08-08 DIAGNOSIS — N20.0: Primary | ICD-10-CM

## 2018-08-14 ENCOUNTER — HOSPITAL ENCOUNTER (OUTPATIENT)
Dept: HOSPITAL 45 - C.NEUR | Age: 68
Discharge: HOME | End: 2018-08-14
Attending: OTOLARYNGOLOGY
Payer: COMMERCIAL

## 2018-08-14 DIAGNOSIS — G47.33: Primary | ICD-10-CM

## 2018-08-15 NOTE — PAP/PSG TECHNICIAN REPORT
[facility]

Technician Polysomnogram Report



Study name:

None

Report date:

8/15/2018



Study date:

8/14/2018

Referring Physician:

Vita Guzman M.D.



Name:

VALERIE TEE

Interpreting Physician:

Frank Krishna M.D.



YOB: 1950

Technician:

Benton Alonso RPSGT.



Sex:

Male







Age:

67

StudyType:

PSG



Weight:











Height:

67 years, Height 6' 1"









BMI:









Medications:

none listed















Patient History





PATIENT HAS HISTORY OF SNORING, WITNESSED APNEAS AND BRET. HE WAS POSITIVE FOR BRET IN 2012 AND WORE 
CPAP BUT WASN'T ABLE TO TOLERATE IT. HE LATER HAD UPPP SURGERY TO HELP REDUCE APNEA. HE IS HERE 
TODAY FOR AN EVALUATION OF BRET. RM 7







Parameters Monitored

NPSG: E1-M2, E2-M1, Fp1-M2, Fp2-M1, F3-M2, F4-M2, F4-M1, C3-M2, C4-M2, C4-M1, O1-M2, O2-M2,

O2-M1, T3-M2, T4-M1, P3-M2, P4-M1, CHIN1, CHIN2, HR, EKG, Legs, PFLOW, SNOR, FLOW, CFLOW,

Tidal Volume, THOR, ABDO, SpO2, PLTH, CPRESS, ETCO2 Wave, ETCO2, pH





Sleep Architecture



Sleep Stages



Time at Lights Off

9:58:32 PM



STAGES

Time (min.)

TST (%)



Time at Lights On

5:35:32 AM



Wake

56.0

--



Total Recording Time (TRT)

457.50 min.



N1

58.0

14



Total Sleep Period (TSP)

450.5 min.



N2

241.5

60



Total Sleep Time (TST)

401.0min.



N3

18.5

5



Awake Time

56.5 min.



REM

83.0

21



Wake after Sleep Onset

50.5 min.











Sleep Efficiency (SE)

88 %











Sleep Onset Latency (SOL)

5.5 min.











Number of Stage 1 Shifts

None











Awakenings

23











Stage Changes

87











Number of REM periods

3



REM

83.0

21



REM Latency

103.0 min.



NREM

318.0

79





Body Position Analysis





Supine

Right

Left

Side

Prone

Vertical



Total Sleep Time (min.)

206.8

87.9

155.7

243.54

0.0

0.0



Total Sleep Time (%)

39%

22%

39%

61

0%

N/A%



Total Sleep Time REM (min.)

0.0

40.5

42.5

None

0.0

0.0



Total Sleep Time NREM (min.)

157.5

47.4

113.2

None

0.0

0.0



Intermittent Wake (min.)

49.4

3.0

3.6

None

0.0

0.0



Total Sleep Period (%)

44%

None





Arousals







Myoclonus (PLM) *







Events

Count

Index



Events

Count

Index



Spontaneous

80

12



Events Awake (PLMW)

27

28.9



Respiratory

19

3.0



Events Asleep w/ Arousal (PLMA)

20

3.0



PLM

19

3



Events Asleep w/o Arousal (PLMS)

206

30.8



Snoring

5

1



Total Asleep

226

33.8



Total

123

18



Total

253

33







Respiratory Analysis *





CA

OA

MA

CH

H

RERA

Total



Count

0

0

1

0

17

17

18



Index

0.0

0.0

0.1

0

2.5

3

5.2



Mean Duration

0.0

0.0

29.7

0.00

21.3

15.3

18.6



Longest Duration

0.0

0.0

29.7

0.00

29.7

19.0

36.1







Respiratory Event Summary 







Total

Supine

~Supine

Right

Left

Prone

REM

NREM



Apneas

Count

1

1

0

N/A

0

1





Index

0.1

0

0

0.0

0.0

N/A

0

0



Hypopneas (4% Desat)

Count

17

12

5

2

3

N/A

4

13





Index

2.5

4.6

1

1.4

1.2

N/A

2.9

2.5



Apneas & All Hypopneas 

Count

18

13

5

2

3

N/A

4

14





Index

2.7

5

1

N/A

2.9

2.6



Respiratory Events 

(Apn+All Hyp+RERA)

Count

18

29

6

2

4

N/A

4

14





Index

5.2

11

1

1.4

1.5

N/A

2.9

5.8



Respiratory Related Arousal

Count

19

29

1

0

1

N/A

0

20





Index

3.0

7

0

N/A

0

4





Snoring Analysis





Supine

Right

Left

Prone

REM

NREM

Total



Snore duration

3.3 min



Snores count

35

12

21

N/A

9

59

68



Snore mean duration

2.9 Sec



Snores index

13

8

8

N/A

6.5

11.1

10.2



TST with snoring (%)

0.8%







Desaturation Event Summary:



Minimum %SpO2

Event Count

Mean/Min/Max Duration(sec.)

Desaturation Index

% Time In Bed



> 90

18

40.2 / 11.5 / 60.0

2.5

93.4



86 - 90

1

11.5 / 11.5 / 11.5

2.0

6.6



81 - 85

0

N/A

0.0

0.0



76 - 80

0

N/A

0.0

0.0



71 - 75

0

N/A

0.0

0.0



66 - 70

0

N/A

0.0

0.0



61 - 65

0

N/A

0.0

0.0



56 - 60

0

N/A

0.0

0.0



51 - 55

0

N/A

0.0

0.0



< 50

0

N/A

0.0

0.0









Total

REM

NREM

Awake



<50%

0.0 min.



51 - 60%

0.0 min.



61 - 70%

0.0 min.



71 - 80%

0.0 min.



81 - 90%

29.9 min.

11.5 min.

17.5 min.

0.9 min.



91 - 100%

426.3 min.

71.5 min.

300.3 min.

54.6 min.



Average

93

92

93

95



Minimum SpO2

88

89

89

88



Desaturation Event Index

2.4

2.9

2.3

2.1



# Desat. Events below 89%

1

N/A

1

0



Time(%) with Saturation below 89%

0.0



Time(min.) with Saturation below 89%

0.1

0.0

0.0

0.1









Time (mins)

REM (mins)

NREM (mins)

% of TST



SpO2 Below 90%

7

3

N4

0.5



SpO2 Below 88%

0





Heart Rate Analysis









Min (bpm)

Max (bpm)

Average (bpm)



Awake

40

71

49



NREM

37

60

45



REM

33

61

45



Overall

33

61

45













Supplemental O2 Values



Minimum O2 level: None



Value  

Start Time

End Time





Technician Comments





Mr. Tee slept in the right, left and supine positions.  PAC's noted. Leg movements noted.  No 
bruxism noted.  Snoring was noted and scored as a 1 on a scale of 1 through 5. (0=no snoring, 
5=snoring loud enough to be heard through a closed door or down the [address]ro way)  Mr. Tee awoke to use 
the restroom 0 times during the night.  Mr. Tee stated I did not sleep as well as I do when I am in 
my own bed. 



The final report will be interpreted and signed by a sleep physician. The completed physician report 
will then be placed in the patient medical record.







 



 



 



 



 



 



 



 

 



Therapy (cm H2O)

0



TIB (min.)

457.0



TST (min.)

401.0



Sleep Onset (min.)

5.5



REM Onset From Sleep (min.)

103.0



Sleep Efficiency %

88



Wakefulness (%)

12



Wakefulness (min.)

56.5



NREM 1 (%)

14



NREM 1 (min.)

58.0



NREM 2 (%)

60



NREM 2 (min.)

241.5



NREM 3 (%)

5



NREM 3 (min.)

18.5







REM (%)

21



REM (min.)

83.0



# Arousals

123



Arousal Index

18



# Snore

68



Snore Index

10.2



AHI

2.7



AHI Supine

5



AHI Non-Supine

1



NREM AHI

2.6



REM AHI

2.9



RDI

5.2



# Obstructive Apnea

0



# Central Apnea

0



# Mixed Apnea

1







# Hypopneas

17



RERAs

17



Total Respiratory Events

35



Time Below SpO2 89% (min.)

0.0



Mean NREM SpO2 (%)

93



Mean REM SpO2 (%)

92



Mean Sleep SpO2 (%)

93



Min NREM SpO2 (%)

89



Min REM SpO2 (%)

89



Position Supine (min.)

206.8



Position Non-supine (min.)

243.5



LM Index Sleep

33.8



LM Index NREM

39.8



LM Index REM

10.8







Mean Heart Rate (bpm)

45



Min Heart Rate (bpm)

33

## 2018-08-17 NOTE — POLYSOMNOGRAPH REPORT
CLINICAL DATA:  A 67-year-old male referred by Dr. Guzman with a previous

history of sleep apnea in 2012.  He was intolerant of CPAP.  He had a UPPP

procedure done and he is here for an evaluation.

 

SLEEP ARCHITECTURE:  Total sleep period was 450.5 minutes.  Total sleep time

was 401 minutes divided between 318 minutes of non-REM sleep and 83 minutes

of REM sleep.  Sleep latency was 5.5 minutes.  REM latency was 103 minutes. 

Sleep efficiency was 88%.  Wake after sleep onset was 50.5 minutes.  Sleep

consisted of stage N1 14%, stage N2 60%, stage N3 5%, and REM 21%.

 

AROUSAL DATA:  123 arousals were recorded for an index of 18 per hour.  80

were spontaneous.

 

PERIODIC LIMB MOVEMENTS DATA:  Moderately elevated limb movements during

sleep were noted.  There were 226 limb movements during sleep noted for an

index of 33.8 per hour with arousal index of 3 per hour.

 

RESPIRATORY DATA:  There was no evidence of clinically significant sleep

apnea seen.  The AHI was 2.7.  The RDI was 5.  There was 1 mixed apneic

episode, 29.7 seconds in duration.  There were 17 hypopneic episodes with a

mean duration of 21.3 seconds.  There were 17 RERAs, with the longest RERA

being 19 seconds.

 

OXIMETRY DATA:  No hypoxemia was seen.  Oxygen betty was 89%.  Mean

saturation was 93%.

 

EKG:  Heart rates ranged from 37 to 61 beats per minute.  PACs were noted.

 

TECHNICIAN'S COMMENTS:  The patient slept in the left, right, and supine

positions.  Snoring was mild, rated 1 on a scale of 1-5.

 

IMPRESSION:  No evidence of clinically significant sleep apnea/hypopnea or

nocturnal hypoxemia.  The patient did have mildly to moderately elevated limb

movements during sleep.  When compared to his previous sleep study showing an

RDI of 42.8, there has been significant improvement.

 

RECOMMENDATIONS:  Patient should continue to practice good sleep hygiene. 

There is no evidence of need for CPAP or an oral appliance based on this

study.

 

 

[facility]

## 2018-08-22 NOTE — CODING QUERY NO DIAGNOSIS
TREATMENT RENDERED WITHOUT A DIAGNOSIS                                                  



To promote full compliance with coding requirements relating to patient care, physician 
participation is requested in all cases of  uncertainty.  Please assist us with 
providing a diagnosis/symptom for the test(s) below:



A diagnosis/symptom was not documented on your Order.  A valid diagnosis/symptom is 
required to bill all insurances.



**Please remember that we are unable to code a diagnosis of rule out, probable, possible, 
questionable, or suspected.  



Tests that require a diagnosis:



DOS: 8/14/18 (Attached order is missing diagnosis)

* Sleep Study      DIAGNOSIS:





















Provider Signature: _____________________________ Date: _________



Thank you  

Dena Mcnulty

Health Information Management

Phone:  [phone]

Fax:  [phone]



Once completed, please kindly fax back to [phone]



For questions please call [phone]